# Patient Record
Sex: FEMALE | Employment: PART TIME | URBAN - METROPOLITAN AREA
[De-identification: names, ages, dates, MRNs, and addresses within clinical notes are randomized per-mention and may not be internally consistent; named-entity substitution may affect disease eponyms.]

---

## 2018-09-19 ENCOUNTER — TRANSFERRED RECORDS (OUTPATIENT)
Dept: HEALTH INFORMATION MANAGEMENT | Facility: CLINIC | Age: 33
End: 2018-09-19

## 2018-09-19 LAB
HPV ABSTRACT: NORMAL
PAP-ABSTRACT: NORMAL

## 2019-11-25 ENCOUNTER — OFFICE VISIT (OUTPATIENT)
Dept: FAMILY MEDICINE | Facility: CLINIC | Age: 34
End: 2019-11-25
Payer: COMMERCIAL

## 2019-11-25 VITALS
BODY MASS INDEX: 33.6 KG/M2 | HEART RATE: 82 BPM | SYSTOLIC BLOOD PRESSURE: 137 MMHG | TEMPERATURE: 98 F | DIASTOLIC BLOOD PRESSURE: 83 MMHG | HEIGHT: 71 IN | OXYGEN SATURATION: 98 % | RESPIRATION RATE: 16 BRPM | WEIGHT: 240 LBS

## 2019-11-25 DIAGNOSIS — R19.7 DIARRHEA, UNSPECIFIED TYPE: ICD-10-CM

## 2019-11-25 DIAGNOSIS — Z00.00 ROUTINE GENERAL MEDICAL EXAMINATION AT A HEALTH CARE FACILITY: Primary | ICD-10-CM

## 2019-11-25 DIAGNOSIS — M25.561 ACUTE PAIN OF RIGHT KNEE: ICD-10-CM

## 2019-11-25 DIAGNOSIS — R87.610 ATYPICAL SQUAMOUS CELLS OF UNDETERMINED SIGNIFICANCE ON CYTOLOGIC SMEAR OF CERVIX (ASC-US): ICD-10-CM

## 2019-11-25 PROBLEM — J31.0 CHRONIC RHINITIS: Status: ACTIVE | Noted: 2018-08-15

## 2019-11-25 LAB
ALBUMIN SERPL-MCNC: 3.8 G/DL (ref 3.4–5)
ALBUMIN UR-MCNC: NEGATIVE MG/DL
ALP SERPL-CCNC: 147 U/L (ref 40–150)
ALT SERPL W P-5'-P-CCNC: 83 U/L (ref 0–50)
ANION GAP SERPL CALCULATED.3IONS-SCNC: 4 MMOL/L (ref 3–14)
APPEARANCE UR: ABNORMAL
AST SERPL W P-5'-P-CCNC: 45 U/L (ref 0–45)
BACTERIA #/AREA URNS HPF: ABNORMAL /HPF
BASOPHILS # BLD AUTO: 0 10E9/L (ref 0–0.2)
BASOPHILS NFR BLD AUTO: 0.5 %
BILIRUB SERPL-MCNC: 0.2 MG/DL (ref 0.2–1.3)
BILIRUB UR QL STRIP: NEGATIVE
BUN SERPL-MCNC: 15 MG/DL (ref 7–30)
CALCIUM SERPL-MCNC: 9.2 MG/DL (ref 8.5–10.1)
CHLORIDE SERPL-SCNC: 106 MMOL/L (ref 94–109)
CO2 SERPL-SCNC: 29 MMOL/L (ref 20–32)
COLOR UR AUTO: YELLOW
CREAT SERPL-MCNC: 0.85 MG/DL (ref 0.52–1.04)
DIFFERENTIAL METHOD BLD: NORMAL
EOSINOPHIL # BLD AUTO: 0.4 10E9/L (ref 0–0.7)
EOSINOPHIL NFR BLD AUTO: 7.6 %
ERYTHROCYTE [DISTWIDTH] IN BLOOD BY AUTOMATED COUNT: 13.9 % (ref 10–15)
GFR SERPL CREATININE-BSD FRML MDRD: 89 ML/MIN/{1.73_M2}
GGT SERPL-CCNC: 98 U/L (ref 0–40)
GLUCOSE SERPL-MCNC: 86 MG/DL (ref 70–99)
GLUCOSE UR STRIP-MCNC: NEGATIVE MG/DL
HCG UR QL: NEGATIVE
HCT VFR BLD AUTO: 42.2 % (ref 35–47)
HGB BLD-MCNC: 13.7 G/DL (ref 11.7–15.7)
HGB UR QL STRIP: NEGATIVE
KETONES UR STRIP-MCNC: NEGATIVE MG/DL
LEUKOCYTE ESTERASE UR QL STRIP: ABNORMAL
LYMPHOCYTES # BLD AUTO: 2.3 10E9/L (ref 0.8–5.3)
LYMPHOCYTES NFR BLD AUTO: 39.9 %
MCH RBC QN AUTO: 29.8 PG (ref 26.5–33)
MCHC RBC AUTO-ENTMCNC: 32.5 G/DL (ref 31.5–36.5)
MCV RBC AUTO: 92 FL (ref 78–100)
MONOCYTES # BLD AUTO: 0.4 10E9/L (ref 0–1.3)
MONOCYTES NFR BLD AUTO: 6.6 %
NEUTROPHILS # BLD AUTO: 2.6 10E9/L (ref 1.6–8.3)
NEUTROPHILS NFR BLD AUTO: 45.4 %
NITRATE UR QL: NEGATIVE
NON-SQ EPI CELLS #/AREA URNS LPF: ABNORMAL /LPF
PH UR STRIP: 5.5 PH (ref 5–7)
PLATELET # BLD AUTO: 216 10E9/L (ref 150–450)
POTASSIUM SERPL-SCNC: 4.1 MMOL/L (ref 3.4–5.3)
PROT SERPL-MCNC: 7.9 G/DL (ref 6.8–8.8)
RBC # BLD AUTO: 4.6 10E12/L (ref 3.8–5.2)
RBC #/AREA URNS AUTO: ABNORMAL /HPF
SODIUM SERPL-SCNC: 139 MMOL/L (ref 133–144)
SOURCE: ABNORMAL
SP GR UR STRIP: >1.03 (ref 1–1.03)
UROBILINOGEN UR STRIP-ACNC: 0.2 EU/DL (ref 0.2–1)
WBC # BLD AUTO: 5.8 10E9/L (ref 4–11)
WBC #/AREA URNS AUTO: ABNORMAL /HPF

## 2019-11-25 PROCEDURE — 81025 URINE PREGNANCY TEST: CPT | Performed by: NURSE PRACTITIONER

## 2019-11-25 PROCEDURE — 99203 OFFICE O/P NEW LOW 30 MIN: CPT | Performed by: NURSE PRACTITIONER

## 2019-11-25 PROCEDURE — 87506 IADNA-DNA/RNA PROBE TQ 6-11: CPT | Performed by: NURSE PRACTITIONER

## 2019-11-25 PROCEDURE — 36415 COLL VENOUS BLD VENIPUNCTURE: CPT | Performed by: NURSE PRACTITIONER

## 2019-11-25 PROCEDURE — 80050 GENERAL HEALTH PANEL: CPT | Performed by: NURSE PRACTITIONER

## 2019-11-25 PROCEDURE — 87086 URINE CULTURE/COLONY COUNT: CPT | Performed by: NURSE PRACTITIONER

## 2019-11-25 PROCEDURE — 82977 ASSAY OF GGT: CPT | Performed by: NURSE PRACTITIONER

## 2019-11-25 PROCEDURE — 80061 LIPID PANEL: CPT | Performed by: NURSE PRACTITIONER

## 2019-11-25 PROCEDURE — 87493 C DIFF AMPLIFIED PROBE: CPT | Performed by: NURSE PRACTITIONER

## 2019-11-25 PROCEDURE — 81001 URINALYSIS AUTO W/SCOPE: CPT | Performed by: NURSE PRACTITIONER

## 2019-11-25 RX ORDER — VITAMIN A ACETATE, .BETA.-CAROTENE, ASCORBIC ACID, CHOLECALCIFEROL, .ALPHA.-TOCOPHEROL ACETATE, DL-, THIAMINE MONONITRATE, RIBOFLAVIN, NIACINAMIDE, PYRIDOXINE HYDROCHLORIDE, FOLIC ACID, CYANOCOBALAMIN, CALCIUM CARBONATE, FERROUS FUMARATE, ZINC OXIDE, AND CUPRIC OXIDE 2000; 2000; 120; 400; 22; 1.84; 3; 20; 10; 1; 12; 200; 27; 25; 2 [IU]/1; [IU]/1; MG/1; [IU]/1; MG/1; MG/1; MG/1; MG/1; MG/1; MG/1; UG/1; MG/1; MG/1; MG/1; MG/1
1 TABLET ORAL DAILY
COMMUNITY

## 2019-11-25 RX ORDER — CHLORAL HYDRATE 500 MG
1000 CAPSULE ORAL DAILY
COMMUNITY

## 2019-11-25 ASSESSMENT — PAIN SCALES - GENERAL: PAINLEVEL: MILD PAIN (3)

## 2019-11-25 ASSESSMENT — MIFFLIN-ST. JEOR: SCORE: 1884.76

## 2019-11-25 NOTE — PATIENT INSTRUCTIONS
The CBC (infection-fighting cells and anemia levels) was normal.  Your pregnancy test was negative.      Let's see what the stool sample shows as well as the labs for gallbladder inflammation.      Try bulking the stool with metamucil or psyllium husk to see if this helps with the frequency.  Avoid fatty foods and stay well-hydrated (10 glasses of water a day)

## 2019-11-25 NOTE — PROGRESS NOTES
Subjective     Shanna Hernandez is a 34 year old female who presents to clinic today for the following health issues:    HPI   Diarrhea  Onset: 1.5 -2 weeks ago    Description:   Consistency of stool: watery, runny and loose  Blood in stool: no   Number of loose stools in past 24 hours: 5    Progression of Symptoms:  worsening    Accompanying Signs & Symptoms:  Fever: no   Nausea or vomiting; no   Abdominal pain: YES-right before she has bowel movement   Episodes of constipation: no   Weight loss: no     History:   Ill contacts: no   Recent use of antibiotics: no    Recent travels: no          Recent medication-new or changes(Rx or OTC): no     Precipitating factors:   Nothing     Alleviating factors:   Nothing     Therapies Tried and outcome:  None  Urgency, frequency  Stools are lquidy/soft  No bleeding  Worse with fatty foods  Couple days of nausea, no vomiting  No history of constipation  No new supplements, no diet changes.    Joint Pain    Onset: 3 weeks ago    Description:   Location: right knee  Character: Sharp , Knee popping out of place, knee locks up, no history injury    Intensity: severe    Progression of Symptoms: worse    Accompanying Signs & Symptoms:  Other symptoms: none    History:   Previous similar pain: no       Precipitating factors:   Trauma or overuse: no     Alleviating factors:  Improved by: nothing    Therapies Tried and outcome: resting         ASCUS HPV NEG 9/19/2018 [facility]  NEEDS REPEAT COTESTING 2021  GAVE BIRTH 4[birth year] to baby girl.  No complications.  She is currently in therapy for post partum depression.  This is going well.  She is a stay at home mom.        Patient Active Problem List   Diagnosis     Chronic rhinitis     Atypical squamous cells of undetermined significance on cytologic smear of cervix (ASC-US)     No past surgical history on file.    Social History     Tobacco Use     Smoking status: Former Smoker     Smokeless tobacco: Former User   Substance Use  "Topics     Alcohol use: Not on file     No family history on file.      Current Outpatient Medications   Medication Sig Dispense Refill     cholecalciferol (VITAMIN D-1000 MAX ST) 25 MCG (1000 UT) TABS Take 1,000 Units by mouth       fish oil-omega-3 fatty acids 1000 MG capsule Take 1,000 mg by mouth       Prenatal Vit-Fe Fumarate-FA (PNV PRENATAL PLUS MULTIVITAMIN) 27-1 MG TABS per tablet Take 1 tablet by mouth       Allergies   Allergen Reactions     Bees      Penicillins      Pineapple      BP Readings from Last 3 Encounters:   11/25/19 137/83    Wt Readings from Last 3 Encounters:   11/25/19 108.9 kg (240 lb)                 Reviewed and updated as needed this visit by Provider         Review of Systems   ROS COMP: Constitutional, HEENT, cardiovascular, pulmonary, GI, , musculoskeletal, neuro, skin, endocrine and psych systems are negative, except as otherwise noted.      Objective    /83 (BP Location: Left arm, Patient Position: Sitting, Cuff Size: Adult Large)   Pulse 82   Temp 98  F (36.7  C) (Oral)   Resp 16   Ht 1.803 m (5' 11\")   Wt 108.9 kg (240 lb)   SpO2 98%   BMI 33.47 kg/m    Body mass index is 33.47 kg/m .  Physical Exam   GENERAL: healthy, alert and no distress  NECK: no adenopathy, no asymmetry, masses, or scars and thyroid normal to palpation  RESP: lungs clear to auscultation - no rales, rhonchi or wheezes  CV: regular rate and rhythm, normal S1 S2, no S3 or S4, no murmur, click or rub, no peripheral edema and peripheral pulses strong  ABDOMEN: soft, nontender, no hepatosplenomegaly, no masses and bowel sounds normal  MS: right knee:  +mcmurrays  PSYCH: mentation appears normal, affect normal/bright    Diagnostic Test Results:  Results for orders placed or performed in visit on 11/25/19 (from the past 24 hour(s))   CBC with platelets and differential   Result Value Ref Range    WBC 5.8 4.0 - 11.0 10e9/L    RBC Count 4.60 3.8 - 5.2 10e12/L    Hemoglobin 13.7 11.7 - 15.7 g/dL    " Hematocrit 42.2 35.0 - 47.0 %    MCV 92 78 - 100 fl    MCH 29.8 26.5 - 33.0 pg    MCHC 32.5 31.5 - 36.5 g/dL    RDW 13.9 10.0 - 15.0 %    Platelet Count 216 150 - 450 10e9/L    % Neutrophils 45.4 %    % Lymphocytes 39.9 %    % Monocytes 6.6 %    % Eosinophils 7.6 %    % Basophils 0.5 %    Absolute Neutrophil 2.6 1.6 - 8.3 10e9/L    Absolute Lymphocytes 2.3 0.8 - 5.3 10e9/L    Absolute Monocytes 0.4 0.0 - 1.3 10e9/L    Absolute Eosinophils 0.4 0.0 - 0.7 10e9/L    Absolute Basophils 0.0 0.0 - 0.2 10e9/L    Diff Method Automated Method    *UA reflex to Microscopic and Culture ([place] [facility] (except [place] and [place])   Result Value Ref Range    Color Urine Yellow     Appearance Urine Slightly Cloudy     Glucose Urine Negative NEG^Negative mg/dL    Bilirubin Urine Negative NEG^Negative    Ketones Urine Negative NEG^Negative mg/dL    Specific Gravity Urine >1.030 1.003 - 1.035    Blood Urine Negative NEG^Negative    pH Urine 5.5 5.0 - 7.0 pH    Protein Albumin Urine Negative NEG^Negative mg/dL    Urobilinogen Urine 0.2 0.2 - 1.0 EU/dL    Nitrite Urine Negative NEG^Negative    Leukocyte Esterase Urine Small (A) NEG^Negative    Source Midstream Urine    HCG Qual, Urine ([id number])   Result Value Ref Range    HCG Qual Urine Negative NEG^Negative   Urine Microscopic   Result Value Ref Range    WBC Urine 5-10 (A) OTO5^0 - 5 /HPF    RBC Urine O - 2 OTO2^O - 2 /HPF    Squamous Epithelial /LPF Urine Moderate (A) FEW^Few /LPF    Bacteria Urine Moderate (A) NEG^Negative /HPF           Assessment & Plan     1. Routine general medical examination at a health care facility  - TSH with free T4 reflex  - Lipid panel reflex to direct LDL Non-fasting    2. Acute pain of right knee  Suspicion for meniscus involvement   - ORTHOPEDICS ADULT REFERRAL    3. Diarrhea, unspecified type  Unclear etiology.  Normal labs as above.  Plan pending results.    - Clostridium difficile Toxin B PCR; Future  - Enteric Bacteria and Virus  "Panel by JARETT Stool; Future  - GGT  - Comprehensive metabolic panel (BMP + Alb, Alk Phos, ALT, AST, Total. Bili, TP)  - *UA reflex to Microscopic and Culture ([place] and [facility] (except [place] and [place])  - HCG Qual, Urine ([id number])  - CBC with platelets and differential  - Urine Culture Aerobic Bacterial    4. Atypical squamous cells of undetermined significance on cytologic smear of cervix (ASC-US)  Due for cotesting 2021.      5. Postpartum depression  Treated with therapy.  Doing well.        BMI:   Estimated body mass index is 33.47 kg/m  as calculated from the following:    Height as of this encounter: 1.803 m (5' 11\").    Weight as of this encounter: 108.9 kg (240 lb).   Weight management plan: Discussed healthy diet and exercise guidelines        Patient Instructions   The CBC (infection-fighting cells and anemia levels) was normal.  Your pregnancy test was negative.      Let's see what the stool sample shows as well as the labs for gallbladder inflammation.      Try bulking the stool with metamucil or psyllium husk to see if this helps with the frequency.  Avoid fatty foods and stay well-hydrated (10 glasses of water a day)      Return in about 1 week (around 12/2/2019), or if symptoms worsen or fail to improve.    SUMANTH Gomez [facility]    "

## 2019-11-26 ENCOUNTER — TELEPHONE (OUTPATIENT)
Dept: FAMILY MEDICINE | Facility: CLINIC | Age: 34
End: 2019-11-26

## 2019-11-26 DIAGNOSIS — R19.7 DIARRHEA, UNSPECIFIED TYPE: ICD-10-CM

## 2019-11-26 DIAGNOSIS — R79.89 ELEVATED LFTS: Primary | ICD-10-CM

## 2019-11-26 LAB
BACTERIA SPEC CULT: NORMAL
C DIFF TOX B STL QL: NEGATIVE
CHOLEST SERPL-MCNC: 252 MG/DL
HDLC SERPL-MCNC: 36 MG/DL
LDLC SERPL CALC-MCNC: 144 MG/DL
NONHDLC SERPL-MCNC: 216 MG/DL
SPECIMEN SOURCE: NORMAL
SPECIMEN SOURCE: NORMAL
TRIGL SERPL-MCNC: 362 MG/DL
TSH SERPL DL<=0.005 MIU/L-ACNC: 1.14 MU/L (ref 0.4–4)

## 2019-11-26 NOTE — TELEPHONE ENCOUNTER
Pt returned call    Best number to reach caller: Home number on file [phone] (home)    Is it ok to leave a detailed message: sent to RN line

## 2019-11-26 NOTE — TELEPHONE ENCOUNTER
Patient called back and was informed of the liver enzyme results.    Shena Dotson RN, [facility] Triage

## 2019-11-26 NOTE — TELEPHONE ENCOUNTER
Notes recorded by Maria De Jesus Gardner APRN CNP on 11/26/2019 at 8:03 AM CST  Please call patient to discuss lab results.  Liver enzymes did come back slightly elevated which could indicate gallbladder inflammation.  I would like to do an ultrasound.  Please have her call [phone] to schedule.  Thank you.  SUMANTH Gomez CNP    This writer attempted to contact Shanna ([facility]) on 11/26/19      Reason for call results, provider instructions  and left message.      If patient calls back:   Registered Nurse called. Follow Triage Call workflow        Kylah Schwab RN

## 2019-11-27 ENCOUNTER — ANCILLARY PROCEDURE (OUTPATIENT)
Dept: ULTRASOUND IMAGING | Facility: CLINIC | Age: 34
End: 2019-11-27
Attending: NURSE PRACTITIONER
Payer: COMMERCIAL

## 2019-11-27 DIAGNOSIS — R79.89 ELEVATED LFTS: ICD-10-CM

## 2019-11-27 PROCEDURE — 76700 US EXAM ABDOM COMPLETE: CPT

## 2019-11-29 DIAGNOSIS — R16.0 HEPATOMEGALY: Primary | ICD-10-CM

## 2019-11-29 DIAGNOSIS — K76.0 FATTY LIVER: ICD-10-CM

## 2019-11-29 DIAGNOSIS — R79.89 ELEVATED LFTS: ICD-10-CM

## 2019-12-02 ENCOUNTER — TELEPHONE (OUTPATIENT)
Dept: FAMILY MEDICINE | Facility: CLINIC | Age: 34
End: 2019-12-02

## 2019-12-02 DIAGNOSIS — R10.84 ABDOMINAL PAIN, GENERALIZED: ICD-10-CM

## 2019-12-02 DIAGNOSIS — R19.7 DIARRHEA, UNSPECIFIED TYPE: Primary | ICD-10-CM

## 2019-12-02 NOTE — TELEPHONE ENCOUNTER
Reason for Call:  Other returning call    Detailed comments: Transferred to RN Line.    Phone Number Patient can be reached at: Home number on file [phone] (home)    Best Time: Any    Can we leave a detailed message on this number? Not Applicable    Call taken on 12/2/2019 at 9:23 AM by Lianet Casiano

## 2019-12-02 NOTE — TELEPHONE ENCOUNTER
Hi there,  Should be ok to travel as he stool samples were negative for infection and labs were ok.  Agree with taking immodium to help with symptoms.  Thank you,  SUMANTH Gomez CNP

## 2019-12-02 NOTE — TELEPHONE ENCOUNTER
Clarified with provider that both CT scan and GI consult were still recommended, before calling patient.  Provider agreed, states she wants patient to see GI for her liver and to have the CT scan for continued diarrhea. Called and spoke with patient and relayed this information.  Patient agreed and understanding, have both consult apt and CT scan scheduled for this week.    Patient had a separate question for provider, she is going to be traveling and flying to [place] on Sunday, will be gone for 5 days. Wants to make sure there would be no concern for her NOT to fly and go on trip. Patient noted she plans to take some medication to try to prevent or help hold diarrhea so maybe won't have to go so much on long plane ride. But otherwise, she stated she doesn't want to go on trip with out asking and doesn't want to find out after the fact that maybe she shouldn't have flown on plane.  Writer advised not likely any reason, at this time, restricting any travel or flying due to symptoms but will verify with provider and give call back. This made patient more comfortable and agreed with plan.    Routing to provider, do you feel there is any reason patient can not go on airplane and travel to [place] for 5 days, starting on Sunday, based on symptoms and need for further work-up?  Thank you.    Kylah Schwab RN  [facility]

## 2019-12-02 NOTE — TELEPHONE ENCOUNTER
Patient called into the clinic and we discussed lab results and GI referral. Number was provided to Shanna to call and schedule an apt with a GI provider. Patient has agreed to schedule.     States that diarrhea is still going on apx 10x/day.     Trying to stay hydrated. Taking pepto- bismol at night so she is able to get some sleep.     Oatmeal and granola with milk for breakfast    Pasta or sometime of grain for lunch    Dinner mashed potatoes and turkey or chicken with a vegetable.     We discussed drinking more than just water and incorporating some Pedialyte and Gatorade to help with electrolytes. Shanna agreed to give this a shot and will stay away from fluids that are red in color.     Routing message to provider to please review and advise.     Vira Siddiqui RN

## 2019-12-02 NOTE — TELEPHONE ENCOUNTER
Reason for Call:  Other appointment and call back    Detailed comments:Pt would like a call back as she is wondering if she should keep her appointment with the GI since she has a CT scheduled as well. Thank you.    Phone Number Patient can be reached at: Home number on file [phone] (home)    Best Time: Any    Can we leave a detailed message on this number? YES    Call taken on 12/2/2019 at 12:22 PM by Lianet Casiano

## 2019-12-02 NOTE — TELEPHONE ENCOUNTER
Called and discussed recommendations from Maria De Jesus Gardner. Patient agreed to schedule the CT scan and will follow the recommendations as well about pumping or feeding prior to receiving the contrast and will pump for 2-3 hrs after and discard that milk.     Patient aware after that she can resume normal feedings.    Vira Siddiqui RN

## 2019-12-02 NOTE — TELEPHONE ENCOUNTER
Called and confirmed with patient that her travel plans are okay per Maria De Jesus Gardner as her labs came back normal and her stool sample negative for infection. Patient also notified that imodium is a good plan to help with sx.     Vira Siddiqui RN

## 2019-12-02 NOTE — TELEPHONE ENCOUNTER
This writer attempted to contact patient on 12/02/19      Reason for call results and left message.      If patient calls back:   Registered Nurse called. Follow Triage Call workflow        Vira Siddiqui, RN    Notes recorded by Maria De Jesus Gardner APRN CNP on 11/29/2019 at 11:06 PM CST  Please call patient to discuss lab results.  Normal stool samples.  Her liver ultrasound showed enlarged and fatty liver.  Given that her liver enzymes are elevated, I'd like her to see GI to discuss further.  Can call MG at [phone] to schedule.  Thank you,  SUMANTH Gomez CNP

## 2019-12-02 NOTE — TELEPHONE ENCOUNTER
Her symptoms should not be caused by the enlarged liver.  I think we should do a CT scan to evaluate further since the diarrhea is worsening.  Please have her call [phone] to schedule.      In meantime, she should also add in some stool bulking agents like metamucil.    For the contrast agent for the CT scan, very little is excreted in the breast milk and is not known to cause harm to the infant.  I would recommend she nurse or pump right before taking the contrast.  If able, pump 2-3 hours after and discard that milk.  Then can continue feeding as she normally would.    Thank you,  SUMANTH Gomez CNP

## 2019-12-04 ENCOUNTER — OFFICE VISIT (OUTPATIENT)
Dept: GASTROENTEROLOGY | Facility: CLINIC | Age: 34
End: 2019-12-04
Attending: NURSE PRACTITIONER
Payer: COMMERCIAL

## 2019-12-04 VITALS
OXYGEN SATURATION: 97 % | HEIGHT: 71 IN | WEIGHT: 242.5 LBS | BODY MASS INDEX: 33.95 KG/M2 | SYSTOLIC BLOOD PRESSURE: 136 MMHG | HEART RATE: 94 BPM | DIASTOLIC BLOOD PRESSURE: 88 MMHG

## 2019-12-04 DIAGNOSIS — K76.0 FATTY LIVER: ICD-10-CM

## 2019-12-04 DIAGNOSIS — R79.89 ELEVATED LFTS: ICD-10-CM

## 2019-12-04 DIAGNOSIS — R19.7 DIARRHEA, UNSPECIFIED TYPE: Primary | ICD-10-CM

## 2019-12-04 LAB
CRP SERPL-MCNC: 10.6 MG/L (ref 0–8)
HAV IGG SER QL IA: NONREACTIVE
HBV CORE AB SERPL QL IA: NONREACTIVE
HBV CORE IGM SERPL QL IA: NONREACTIVE
HBV SURFACE AB SERPL IA-ACNC: 662.22 M[IU]/ML
HBV SURFACE AG SERPL QL IA: NONREACTIVE
HCV AB SERPL QL IA: NONREACTIVE

## 2019-12-04 PROCEDURE — 87177 OVA AND PARASITES SMEARS: CPT | Performed by: PHYSICIAN ASSISTANT

## 2019-12-04 PROCEDURE — 36415 COLL VENOUS BLD VENIPUNCTURE: CPT | Performed by: PHYSICIAN ASSISTANT

## 2019-12-04 PROCEDURE — 82104 ALPHA-1-ANTITRYPSIN PHENO: CPT | Performed by: PHYSICIAN ASSISTANT

## 2019-12-04 PROCEDURE — 86708 HEPATITIS A ANTIBODY: CPT | Performed by: PHYSICIAN ASSISTANT

## 2019-12-04 PROCEDURE — 86803 HEPATITIS C AB TEST: CPT | Performed by: PHYSICIAN ASSISTANT

## 2019-12-04 PROCEDURE — 83993 ASSAY FOR CALPROTECTIN FECAL: CPT | Performed by: PHYSICIAN ASSISTANT

## 2019-12-04 PROCEDURE — 83516 IMMUNOASSAY NONANTIBODY: CPT | Performed by: PHYSICIAN ASSISTANT

## 2019-12-04 PROCEDURE — 86705 HEP B CORE ANTIBODY IGM: CPT | Performed by: PHYSICIAN ASSISTANT

## 2019-12-04 PROCEDURE — 86706 HEP B SURFACE ANTIBODY: CPT | Performed by: PHYSICIAN ASSISTANT

## 2019-12-04 PROCEDURE — 99000 SPECIMEN HANDLING OFFICE-LAB: CPT | Performed by: PHYSICIAN ASSISTANT

## 2019-12-04 PROCEDURE — 86038 ANTINUCLEAR ANTIBODIES: CPT | Performed by: PHYSICIAN ASSISTANT

## 2019-12-04 PROCEDURE — 87340 HEPATITIS B SURFACE AG IA: CPT | Performed by: PHYSICIAN ASSISTANT

## 2019-12-04 PROCEDURE — 82784 ASSAY IGA/IGD/IGG/IGM EACH: CPT | Performed by: PHYSICIAN ASSISTANT

## 2019-12-04 PROCEDURE — 86140 C-REACTIVE PROTEIN: CPT | Performed by: PHYSICIAN ASSISTANT

## 2019-12-04 PROCEDURE — 80076 HEPATIC FUNCTION PANEL: CPT | Performed by: PHYSICIAN ASSISTANT

## 2019-12-04 PROCEDURE — 99204 OFFICE O/P NEW MOD 45 MIN: CPT | Performed by: PHYSICIAN ASSISTANT

## 2019-12-04 PROCEDURE — 86704 HEP B CORE ANTIBODY TOTAL: CPT | Performed by: PHYSICIAN ASSISTANT

## 2019-12-04 PROCEDURE — 83516 IMMUNOASSAY NONANTIBODY: CPT | Mod: 59 | Performed by: PHYSICIAN ASSISTANT

## 2019-12-04 PROCEDURE — 82103 ALPHA-1-ANTITRYPSIN TOTAL: CPT | Performed by: PHYSICIAN ASSISTANT

## 2019-12-04 PROCEDURE — 87328 CRYPTOSPORIDIUM AG IA: CPT | Performed by: PHYSICIAN ASSISTANT

## 2019-12-04 PROCEDURE — 87209 SMEAR COMPLEX STAIN: CPT | Performed by: PHYSICIAN ASSISTANT

## 2019-12-04 PROCEDURE — 87329 GIARDIA AG IA: CPT | Performed by: PHYSICIAN ASSISTANT

## 2019-12-04 RX ORDER — RIBOFLAVIN (VITAMIN B2) 100 MG
100 TABLET ORAL DAILY
COMMUNITY

## 2019-12-04 ASSESSMENT — MIFFLIN-ST. JEOR: SCORE: 1896.1

## 2019-12-04 ASSESSMENT — PAIN SCALES - GENERAL: PAINLEVEL: NO PAIN (0)

## 2019-12-04 NOTE — PATIENT INSTRUCTIONS
Thank you for visiting our Gastroenterology office today.     Please head down to the lab to obtain blood work. Please submit your stool samples when completed to any [facility] or [facility]. We will either call you or send you a My Chart message with your results.     Start taking citrucel or metamucil fiber one scoop a day. You can take imodium as needed.     Keep a food diary and log your symptoms.     Follow up in 3-4 weeks if no improvement in symptoms.       Patient Education     Nonalcoholic Fatty Liver Disease (NAFLD)  Nonalcoholic fatty liver disease (NAFLD) is a common disease of the liver. It occurs when you have too much fat in the liver. If NAFLD is severe, it can cause liver damage that seems like the damage caused by drinking too much alcohol. But NAFLD is not caused by drinking alcohol. This sheet tells you more about NAFLD and how it can be managed.    How the liver works   The liver is an organ in the upper right side of the belly (abdomen). It has many important jobs. These include:    Breaking down (metabolizing) proteins, carbohydrates, and fats    Making a substance called bile that helps break down fats    Storing and releasing sugar (glucose) into the blood to give the body energy    Removing toxins from the blood    Helping with blood clotting  Understanding NAFLD  A healthy liver may contain some fat. But if too much fat builds up in the liver, this causes NAFLD. NAFLD can be mild, causing fatty liver. Or it can be more severe and show inflammation, as well as the fat. This can cause non-alcoholic steatohepatitis (CLAUDIO).    Fatty liver. With fatty liver, the liver simply has more fat than normal. This extra fat usually does not harm the liver.    CLAUDIO. With CLAUDIO, the fatty liver becomes inflamed over time. CLAUDIO is serious because it can lead to scarring of the liver (fibrosis). Over time, the scarring may lead to cirrhosis of the liver. This can eventually cause liver  failure or liver cancer.  Causes and risk factors of NAFLD  Doctors don't know what causes NAFLD. But certain things make the problem more likely to happen. These include:    Obesity    Prediabetes or diabetes    High levels of fat found in the blood (cholesterol and triglycerides)    Being exposed to certain medicines   Symptoms of NAFLD  Most people with NAFLD have no symptoms. If symptoms do occur, they can include:    Tiredness    Weakness    Weight loss    Loss of appetite    Nausea and vomiting    Belly pain and cramping    Yellowing of the skin and eyes (jaundice), as well as dark urine, or light-colored stools    Swelling in the belly or legs  Diagnosing NAFLD  Your healthcare provider may think you have NAFLD if routine blood tests show high levels of liver enzymes. This may mean you have a liver problem. You may need one or more imaging tests, such as an ultrasound, CT, or MRI. You may need more blood tests to look for other causes of liver disease. You may also need a liver biopsy. During this test, a hollow needle is used to remove a tiny tissue sample from your liver. This tissue is then checked in a lab. This test can find signs of damage to liver tissue. It can also help figure out the cause of the damage and tell the difference between fatty liver and CLAUDIO.  Treating NAFLD  Treatment for NAFLD varies for each person. The best early treatment is to treat any underlying conditions causing metabolic syndrome. This is the name for a group of conditions that includes:    High blood pressure    High levels of cholesterol and triglycerides    Being overweight or obese    Diabetes  Your healthcare provider will monitor your health and treat any symptoms or underlying health problems you have. Your provider will also work with you to control your risk factors. This will make liver damage less likely. In fact, treating those underlying conditions can often improve liver disease. You may need to take certain  medicines, but no medicine will cure NAFLD. This is why treating the underlying conditions is most important. Your plan may include:    Losing extra weight    Getting regular exercise    Controlling diabetes and high cholesterol or triglyceride levels    Taking medicines and vitamins as prescribed by your provider    Quitting smoking    Not drinking alcohol    Eating a healthy and balanced diet  Living with NAFLD  If NAFLD is caught early, it can be managed with treatment. Your healthcare provider will discuss further treatment choices with you as needed.  Be sure to ask your provider about recommended vaccines. These include vaccines for viruses that can cause liver disease.  Date Last Reviewed: 12/1/2016 2000-2018 [employer]. [address] [place] [address]. All rights reserved. This information is not intended as a substitute for professional medical care. Always follow your healthcare professional's instructions.

## 2019-12-04 NOTE — NURSING NOTE
"Shanna Hernandez's goals for this visit include:   Chief Complaint   Patient presents with     RECHECK     follow up elevtated LFT's, fatty liver, and hepatomegaly       She requests these members of her care team be copied on today's visit information: PCP    PCP: Maria De Jesus Gardner    Referring Provider:  Maria De Jesus Gardner, APRN CNP  [address] [place] [address]    /88 (BP Location: Left arm, Patient Position: Sitting, Cuff Size: Adult Regular)   Pulse 94   Ht 1.803 m (5' 11\")   Wt 110 kg (242 lb 8 oz)   SpO2 97%   BMI 33.82 kg/m      Do you need any medication refills at today's visit? No    Sherrie Bearden LPN      "

## 2019-12-04 NOTE — PROGRESS NOTES
GASTROENTEROLOGY NEW PATIENT CLINIC VISIT    CC/REFERRING MD:    Clinic, [facility]  Maria De Jesus Gardner    REASON FOR CONSULTATION:   Referred by Maria De Jesus Gardner for RECHECK (follow up elevtated LFT's, fatty liver, and hepatomegaly)      HISTORY OF PRESENT ILLNESS:    Shanna Hernandez is 34 year old female who presents for evaluation of elevated LFT's and fatty liver. She also mentions diarrhea for the past 2.5 to 4 weeks.     She was recently noted to have a mildly elevated ALT level incidentally on lab work. A follow up abd sonogram revealed hepatomegaly with diffuse hepatic parenchymal disease most compatible with diffuse fatty liver. She does not smoke. She drinks very rarely as she is currently breast feeding. She denies previous hx of hepatitis.     In regards to diarrhea, she notes these symptoms developed at 2.5 weeks ago, but up to 4 weeks of symptoms. She may experience up to 10 stools a day which are runny and/or watery. She has some abdominal discomfort with these bowel movements, but this typically resolves after a BM. She notes having loose stools more so after breastfeeding. She hasn't been able to correlate her recent diarrhea with diet. She denies any foreign travel. No change in medications. No recent abx use. No recent sick contacts.  and patient do note more stress recently with an 8 month old daughter and holiday season. She was tested recently with stool studies which were unremarkable. She is scheduled for a CT scan of her abd for tomorrow, ordered by PCP.     No pertinent family hx.         PROBLEM LIST  Patient Active Problem List    Diagnosis Date Noted     Postpartum depression 11/25/2019     Priority: Medium     Atypical squamous cells of undetermined significance on cytologic smear of cervix (ASC-US) 10/01/2018     Priority: Medium     [facility] Review:    History:    2018: ASCUS HPV-    Plan, per ASCCP guidelines: Repeat co-test in 3 years (2021)        Chronic rhinitis 08/15/2018     Priority: Medium       PERTINENT PAST MEDICAL HISTORY:  (I personally reviewed this history with the patient at today's visit)   No past medical history on file.      PREVIOUS SURGERIES: (I personally reviewed this history with the patient at today's visit)   No past surgical history on file.      ALLERGIES:     Allergies   Allergen Reactions     Bees      Penicillins      Pineapple        PERTINENT MEDICATIONS:    Current Outpatient Medications:      cholecalciferol (VITAMIN D-1000 MAX ST) 25 MCG (1000 UT) TABS, Take 1,000 Units by mouth daily , Disp: , Rfl:      fish oil-omega-3 fatty acids 1000 MG capsule, Take 1,000 mg by mouth daily , Disp: , Rfl:      Prenatal Vit-Fe Fumarate-FA (PNV PRENATAL PLUS MULTIVITAMIN) 27-1 MG TABS per tablet, Take 1 tablet by mouth daily , Disp: , Rfl:      vitamin C (ASCORBIC ACID) 100 MG tablet, Take 100 mg by mouth daily, Disp: , Rfl:     SOCIAL HISTORY:  Social History     Socioeconomic History     Marital status:      Spouse name: Not on file     Number of children: Not on file     Years of education: Not on file     Highest education level: Not on file   Occupational History     Not on file   Social Needs     Financial resource strain: Not on file     Food insecurity:     Worry: Not on file     Inability: Not on file     Transportation needs:     Medical: Not on file     Non-medical: Not on file   Tobacco Use     Smoking status: Former Smoker     Smokeless tobacco: Former User   Substance and Sexual Activity     Alcohol use: Not on file     Drug use: Not on file     Sexual activity: Not on file   Lifestyle     Physical activity:     Days per week: Not on file     Minutes per session: Not on file     Stress: Not on file   Relationships     Social connections:     Talks on phone: Not on file     Gets together: Not on file     Attends Christianity service: Not on file     Active member of club or organization: Not on file     Attends  "meetings of clubs or organizations: Not on file     Relationship status: Not on file     Intimate partner violence:     Fear of current or ex partner: Not on file     Emotionally abused: Not on file     Physically abused: Not on file     Forced sexual activity: Not on file   Other Topics Concern     Not on file   Social History Narrative     Not on file       FAMILY HISTORY: (I personally reviewed this history with the patient at today's visit)  No family history on file.       Review of Systems  ROS:    No fevers or chills  No weight loss  No blurry vision, double vision or change in vision  No sore throat  No lymphadenopathy  No headache, paraesthesias, or weakness in a limb  No shortness of breath or wheezing  No chest pain or pressure  No arthralgias or myalgias  No rashes or skin changes  No odynophagia or dysphagia  No BRBPR, hematochezia, melena  No dysuria, frequency or urgency  No hot/cold intolerance or polyria  No anxiety or depression  PHYSICAL EXAMINATION:  Constitutional: aaox3, cooperative, pleasant, not dyspneic/diaphoretic, no acute distress  Vitals reviewed: /88 (BP Location: Left arm, Patient Position: Sitting, Cuff Size: Adult Regular)   Pulse 94   Ht 1.803 m (5' 11\")   Wt 110 kg (242 lb 8 oz)   SpO2 97%   BMI 33.82 kg/m     Wt:   Wt Readings from Last 2 Encounters:   12/04/19 110 kg (242 lb 8 oz)   11/25/19 108.9 kg (240 lb)        Eyes: Sclera anicteric/injected  Ears/nose/mouth/throat: Normal oropharynx without ulcers or exudate, mucus membranes moist, hearing intact  Neck: supple, thyroid normal size  CV: No edema, RRR  Respiratory: Unlabored breathing, CTAB  Abd:  Nondistended, no masses, +bs, nontender, no peritoneal signs  Skin: warm, perfused, no jaundice  Psych: Normal affect  MSK: Normal gait      PERTINENT STUDIES: (I personally reviewed these laboratory studies today)  Most recent CBC:   Recent Labs   Lab Test 11/25/19  1325   WBC 5.8   HGB 13.7   HCT 42.2    "     Most recent hepatic panel:  Recent Labs   Lab Test 11/25/19  1325   ALT 83*   AST 45     Most recent creatinine:  Recent Labs   Lab Test 11/25/19  1325   CR 0.85       TSH   Date Value Ref Range Status   11/25/2019 1.14 0.40 - 4.00 mU/L Final         RADIOLOGY:     Abdominal ultrasound 11/27/2019     Comparisons: None     HISTORY:    Elevated LFTs. Diarrhea.     FINDINGS:    The liver measures a craniocaudal dimension of 18.3 cm.  No focal liver lesion. There is marked diffuse increased echogenicity  of the liver parenchyma with significant attenuation of the ultrasound  beam. The spleen measures 11.9 cm. The gallbladder is normal. There is  no gallbladder wall thickening or pericholecystic fluid. No  sonographic Bills's sign was elicited.  The diameter of the common  bile duct measures 4mm. The pancreas is partially obscured but  otherwise appears unremarkable. The aorta and IVC are visualized. The  right and left kidneys measure 12.1cm and 12.9 cm , respectively. No  solid renal mass, stone or hydronephrosis.                                                                      IMPRESSION:    Hepatomegaly with diffuse hepatic parenchymal disease,  most compatible with diffuse fatty infiltration of the liver.     ANA ROSA CROUCH MD        ASSESSMENT/PLAN:    Shanna Hernandez is a 34 year old female who presents for evaluation of elevated LFT's and diarrhea.      Elevated LFT's/fatty liver - mild elevation in ALT level noted incidentally recently. Likely related to fatty liver which was noted on recent abd sono. NAFLD fibosis score correlates with fibrosis of F0-F2. Reviewed fatty liver disease diagnosis and management. Recommended healthy lifestyle changes such as diet and exercise and mgmt of any chronic conditions should they develop. At this time will check liver serologies to r/o underlying concerns, but seems unlikely. Will also have lft's rechecked today.      Diarrhea - reviewed recent stool  studies which were normal. Will check further stool studies including fecal calpro.  She does have a CT scan tomorrow. If fecal calpro elevated and or concerns for colitis noted will need to proced with colonoscopy. Will also check celiac serology. If positive or concerning, should consider upper endoscopy with small bowel bx. Symptoms however seem to be more related to IBS at this time. Recommended she trial fiber supplementation to bulk up stools and to keep food diary/log symptoms and trial food elimination diet. She can also take     Follow up in 3-4 weeks if no improvement in symptoms.     Diarrhea, unspecified type  Elevated LFTs  Fatty liver        Orders Placed This Encounter   Procedures     Hepatitis A Antibody IgG     Standing Status:   Future     Standing Expiration Date:   12/3/2020     Scheduling Instructions:      Order only when assessing immunity for HAV from either vaccination or previous infection.      Hepatitis B surface antigen     Standing Status:   Future     Standing Expiration Date:   12/3/2020     Hepatitis B Surface Antibody     Standing Status:   Future     Standing Expiration Date:   12/3/2020     Hepatitis B core antibody     Standing Status:   Future     Standing Expiration Date:   12/3/2020     Hepatitis B core antibody IgM     Standing Status:   Future     Standing Expiration Date:   12/3/2020     Hepatitis C antibody     Standing Status:   Future     Standing Expiration Date:   12/3/2020     IgG     Standing Status:   Future     Standing Expiration Date:   12/3/2020     Anti Nuclear Deneen IgG by IFA with Reflex     Standing Status:   Future     Standing Expiration Date:   12/4/2020     F Actin EIA with reflex     Standing Status:   Future     Standing Expiration Date:   12/4/2020     Tissue transglutaminase deneen IgA and IgG     Standing Status:   Future     Standing Expiration Date:   12/3/2020     IgA     Standing Status:   Future     Standing Expiration Date:   12/3/2020      Mitochondrial M2 Antibody IgG     Standing Status:   Future     Standing Expiration Date:   12/3/2020     Alpha 1 Antitrypsin     Standing Status:   Future     Standing Expiration Date:   12/3/2020     Calprotectin Feces     Standing Status:   Future     Standing Expiration Date:   12/3/2020     Cryptosporidium/Giardia Immunoassay     Standing Status:   Future     Standing Expiration Date:   12/4/2020     Ova and Parasite Exam Routine     Standing Status:   Future     Standing Expiration Date:   12/3/2020         Thank you for this consultation.  It was a pleasure to participate in the care of this patient; please contact us with any further questions.      This note was created with voice recognition software, and while reviewed for accuracy, typos may remain.     Amarilis Arthur PA-C  Gastroenterology  [facility]

## 2019-12-05 ENCOUNTER — ANCILLARY PROCEDURE (OUTPATIENT)
Dept: CT IMAGING | Facility: CLINIC | Age: 34
End: 2019-12-05
Attending: NURSE PRACTITIONER
Payer: COMMERCIAL

## 2019-12-05 DIAGNOSIS — R19.7 DIARRHEA, UNSPECIFIED TYPE: ICD-10-CM

## 2019-12-05 DIAGNOSIS — R10.84 ABDOMINAL PAIN, GENERALIZED: ICD-10-CM

## 2019-12-05 LAB
ANA SER QL IF: NEGATIVE
C PARVUM AG STL QL IA: NEGATIVE
G LAMBLIA AG STL QL IA: NEGATIVE
IGA SERPL-MCNC: 312 MG/DL (ref 70–380)
IGG SERPL-MCNC: 1270 MG/DL (ref 695–1620)
MITOCHONDRIA M2 IGG SER-ACNC: 1 U/ML
O+P STL MICRO: NORMAL
O+P STL MICRO: NORMAL
SMA IGG SER-ACNC: 10 UNITS (ref 0–19)
SPECIMEN SOURCE: NORMAL
SPECIMEN SOURCE: NORMAL
TTG IGA SER-ACNC: 1 U/ML
TTG IGG SER-ACNC: <1 U/ML

## 2019-12-05 PROCEDURE — 74177 CT ABD & PELVIS W/CONTRAST: CPT | Performed by: RADIOLOGY

## 2019-12-05 RX ORDER — IOPAMIDOL 755 MG/ML
135 INJECTION, SOLUTION INTRAVASCULAR ONCE
Status: COMPLETED | OUTPATIENT
Start: 2019-12-05 | End: 2019-12-05

## 2019-12-05 RX ADMIN — IOPAMIDOL 131 ML: 755 INJECTION, SOLUTION INTRAVASCULAR at 14:59

## 2019-12-06 ENCOUNTER — TELEPHONE (OUTPATIENT)
Dept: FAMILY MEDICINE | Facility: CLINIC | Age: 34
End: 2019-12-06

## 2019-12-06 LAB
A1AT PHENOTYP SERPL-IMP: NORMAL
A1AT SERPL-MCNC: 112 MG/DL (ref 90–200)
ALBUMIN SERPL-MCNC: 3.5 G/DL (ref 3.4–5)
ALP SERPL-CCNC: 137 U/L (ref 40–150)
ALT SERPL W P-5'-P-CCNC: 89 U/L (ref 0–50)
AST SERPL W P-5'-P-CCNC: 54 U/L (ref 0–45)
BILIRUB DIRECT SERPL-MCNC: <0.1 MG/DL (ref 0–0.2)
BILIRUB SERPL-MCNC: 0.2 MG/DL (ref 0.2–1.3)
CALPROTECTIN STL-MCNT: 276 MG/KG (ref 0–49.9)
PROT SERPL-MCNC: 7.7 G/DL (ref 6.8–8.8)

## 2019-12-06 NOTE — TELEPHONE ENCOUNTER
This writer attempted to contact Shanna on 12/06/19      Reason for call results and left message.      If patient calls back:   Registered Nurse called. Follow Triage Call workflow        Patricia Roe RN     Notes recorded by Maria De Jesus Gardner APRN CNP on 12/5/2019 at 4:58 PM CST  Please call patient to discuss results.  There is no acute finding on her CT scan.  Recommend she stick with the plan to see GI.  Can do immodium as needed for diarrhea. Thanks!  Maria De Jesus

## 2019-12-06 NOTE — TELEPHONE ENCOUNTER
Pt returned call    Best number to reach caller: Home number on file [phone] (home)    Is it ok to leave a detailed message: sent to RN line

## 2019-12-12 ENCOUNTER — TELEPHONE (OUTPATIENT)
Dept: GASTROENTEROLOGY | Facility: CLINIC | Age: 34
End: 2019-12-12

## 2019-12-12 DIAGNOSIS — R79.89 ELEVATED LFTS: Primary | ICD-10-CM

## 2020-01-13 DIAGNOSIS — R79.89 ELEVATED LFTS: ICD-10-CM

## 2020-01-13 LAB
ALBUMIN SERPL-MCNC: 3.8 G/DL (ref 3.4–5)
ALP SERPL-CCNC: 143 U/L (ref 40–150)
ALT SERPL W P-5'-P-CCNC: 88 U/L (ref 0–50)
AST SERPL W P-5'-P-CCNC: 47 U/L (ref 0–45)
BILIRUB DIRECT SERPL-MCNC: <0.1 MG/DL (ref 0–0.2)
BILIRUB SERPL-MCNC: 0.3 MG/DL (ref 0.2–1.3)
PROT SERPL-MCNC: 8 G/DL (ref 6.8–8.8)

## 2020-01-13 PROCEDURE — 36415 COLL VENOUS BLD VENIPUNCTURE: CPT | Performed by: PHYSICIAN ASSISTANT

## 2020-01-13 PROCEDURE — 80076 HEPATIC FUNCTION PANEL: CPT | Performed by: PHYSICIAN ASSISTANT

## 2020-01-15 ENCOUNTER — OFFICE VISIT (OUTPATIENT)
Dept: GASTROENTEROLOGY | Facility: CLINIC | Age: 35
End: 2020-01-15
Payer: COMMERCIAL

## 2020-01-15 VITALS
DIASTOLIC BLOOD PRESSURE: 81 MMHG | BODY MASS INDEX: 34.24 KG/M2 | OXYGEN SATURATION: 95 % | TEMPERATURE: 97.8 F | HEART RATE: 86 BPM | WEIGHT: 245.5 LBS | RESPIRATION RATE: 20 BRPM | SYSTOLIC BLOOD PRESSURE: 125 MMHG

## 2020-01-15 DIAGNOSIS — R19.5 FECES CONTENTS ABNORMAL: ICD-10-CM

## 2020-01-15 DIAGNOSIS — K76.0 FATTY LIVER: ICD-10-CM

## 2020-01-15 DIAGNOSIS — R19.7 DIARRHEA, UNSPECIFIED TYPE: ICD-10-CM

## 2020-01-15 DIAGNOSIS — R79.89 ELEVATED LFTS: Primary | ICD-10-CM

## 2020-01-15 PROCEDURE — 99213 OFFICE O/P EST LOW 20 MIN: CPT | Performed by: PHYSICIAN ASSISTANT

## 2020-01-15 ASSESSMENT — PAIN SCALES - GENERAL: PAINLEVEL: NO PAIN (0)

## 2020-01-15 NOTE — NURSING NOTE
Shanna Hernandez's goals for this visit include:   Chief Complaint   Patient presents with     RECHECK     Four week follow up       She requests these members of her care team be copied on today's visit information: Yes    PCP: Maria De Jesus Gardner    Referring Provider:  No referring provider defined for this encounter.    /81 (BP Location: Left arm, Patient Position: Sitting, Cuff Size: Adult Large)   Pulse 86   Temp 97.8  F (36.6  C) (Oral)   Resp 20   Wt 111.4 kg (245 lb 8 oz)   SpO2 95%   BMI 34.24 kg/m      Do you need any medication refills at today's visit? No    Davion Angeles CMA

## 2020-01-15 NOTE — PROGRESS NOTES
GASTROENTEROLOGY FOLLOW UP CLINIC VISIT    CC/REFERRING MD:    Clinic, [facility]  Maria De Jesus Gardner    REASON FOR CONSULTATION:   Referred by Maria De Jesus Gardner for RECHECK (Four week follow up)      HISTORY OF PRESENT ILLNESS:    Shanna Hernandez is 34 year old female who presents for follow up.  She was initially seen last month for concerns with elevated LFTs and fatty liver.  She was noted to have mildly elevated elevated ALT and AST.  I further evaluated with liver serologies all of which were unremarkable including viral hepatitis panel, autoimmune markers and celiac serology.  We repeated her LFTs recently which continued to be mildly elevated.  She denies any abdominal pain, nausea, vomiting, itching, jaundice.     She was also having diarrhea for several weeks which we discussed at our initial office visit.  She was further evaluated with stool studies which were negative for infection.  We also checked fecal calprotectin level which was mildly elevated at 276.  She notes at this time that her diarrhea is much improved.  She was initially having up to 10 stools a day and she is now noting maybe 1-3 softer semi-formed stools daily.  She believes that diarrhea was related to stress around the holidays.     No pertinent family hx.         PROBLEM LIST  Patient Active Problem List    Diagnosis Date Noted     Postpartum depression 11/25/2019     Priority: Medium     Atypical squamous cells of undetermined significance on cytologic smear of cervix (ASC-US) 10/01/2018     Priority: Medium     [facility] Review:    History:    2018: ASCUS HPV-    Plan, per ASCCP guidelines: Repeat co-test in 3 years (2021)       Chronic rhinitis 08/15/2018     Priority: Medium       PERTINENT PAST MEDICAL HISTORY:  (I personally reviewed this history with the patient at today's visit)   No past medical history on file.      PREVIOUS SURGERIES: (I personally reviewed this history with the patient at  today's visit)   No past surgical history on file.      ALLERGIES:     Allergies   Allergen Reactions     Bees      Penicillins      Pineapple        PERTINENT MEDICATIONS:    Current Outpatient Medications:      cholecalciferol (VITAMIN D-1000 MAX ST) 25 MCG (1000 UT) TABS, Take 1,000 Units by mouth daily , Disp: , Rfl:      fish oil-omega-3 fatty acids 1000 MG capsule, Take 1,000 mg by mouth daily , Disp: , Rfl:      Prenatal Vit-Fe Fumarate-FA (PNV PRENATAL PLUS MULTIVITAMIN) 27-1 MG TABS per tablet, Take 1 tablet by mouth daily , Disp: , Rfl:      vitamin C (ASCORBIC ACID) 100 MG tablet, Take 100 mg by mouth daily, Disp: , Rfl:     SOCIAL HISTORY:  Social History     Socioeconomic History     Marital status:      Spouse name: Not on file     Number of children: Not on file     Years of education: Not on file     Highest education level: Not on file   Occupational History     Not on file   Social Needs     Financial resource strain: Not on file     Food insecurity:     Worry: Not on file     Inability: Not on file     Transportation needs:     Medical: Not on file     Non-medical: Not on file   Tobacco Use     Smoking status: Former Smoker     Smokeless tobacco: Former User   Substance and Sexual Activity     Alcohol use: Not on file     Drug use: Not on file     Sexual activity: Not on file   Lifestyle     Physical activity:     Days per week: Not on file     Minutes per session: Not on file     Stress: Not on file   Relationships     Social connections:     Talks on phone: Not on file     Gets together: Not on file     Attends [employer] service: Not on file     Active member of club or organization: Not on file     Attends meetings of clubs or organizations: Not on file     Relationship status: Not on file     Intimate partner violence:     Fear of current or ex partner: Not on file     Emotionally abused: Not on file     Physically abused: Not on file     Forced sexual activity: Not on file   Other  Topics Concern     Not on file   Social History Narrative     Not on file       FAMILY HISTORY: (I personally reviewed this history with the patient at today's visit)  No family history on file.       ROS:  No fevers or chills  No weight loss  No sore throat  No shortness of breath or wheezing  No chest pain or pressure  No arthralgias or myalgias  No rashes or skin changes  No odynophagia or dysphagia  No BRBPR, hematochezia, melena  No dysuria, frequency or urgency  No hot/cold intolerance or polyria  No anxiety or depression  PHYSICAL EXAMINATION:  Constitutional: aaox3, cooperative, pleasant, not dyspneic/diaphoretic, no acute distress  Vitals reviewed: /81 (BP Location: Left arm, Patient Position: Sitting, Cuff Size: Adult Large)   Pulse 86   Temp 97.8  F (36.6  C) (Oral)   Resp 20   Wt 111.4 kg (245 lb 8 oz)   SpO2 95%   BMI 34.24 kg/m     Wt:   Wt Readings from Last 2 Encounters:   01/15/20 111.4 kg (245 lb 8 oz)   12/04/19 110 kg (242 lb 8 oz)        Eyes: Sclera anicteric/injected  Ears/nose/mouth/throat: Normal oropharynx without ulcers or exudate, mucus membranes moist  Neck: supple  CV: No edema, RRR  Respiratory: Unlabored breathing, CTAB  Abd:  Nondistended, no masses, +bs, nontender, no peritoneal signs  Skin: warm, perfused, no jaundice  Psych: Normal affect  MSK: Normal gait      PERTINENT STUDIES: (I personally reviewed these laboratory studies today)  Most recent CBC:   Recent Labs   Lab Test 11/25/19  1325   WBC 5.8   HGB 13.7   HCT 42.2        Most recent hepatic panel:  Recent Labs   Lab Test 01/13/20  1102 12/04/19  0937   ALT 88* 89*   AST 47* 54*     Most recent creatinine:  Recent Labs   Lab Test 11/25/19  1325   CR 0.85       TSH   Date Value Ref Range Status   11/25/2019 1.14 0.40 - 4.00 mU/L Final         RADIOLOGY:     Abdominal ultrasound 11/27/2019     Comparisons: None     HISTORY:    Elevated LFTs. Diarrhea.     FINDINGS:    The liver measures a craniocaudal  dimension of 18.3 cm.  No focal liver lesion. There is marked diffuse increased echogenicity  of the liver parenchyma with significant attenuation of the ultrasound  beam. The spleen measures 11.9 cm. The gallbladder is normal. There is  no gallbladder wall thickening or pericholecystic fluid. No  sonographic Bills's sign was elicited.  The diameter of the common  bile duct measures 4mm. The pancreas is partially obscured but  otherwise appears unremarkable. The aorta and IVC are visualized. The  right and left kidneys measure 12.1cm and 12.9 cm , respectively. No  solid renal mass, stone or hydronephrosis.                                                                      IMPRESSION:    Hepatomegaly with diffuse hepatic parenchymal disease,  most compatible with diffuse fatty infiltration of the liver.     ANA ROSA CROUCH MD        ASSESSMENT/PLAN:    Shanna Hernandez is a 34 year old female who presents for follow up.     Elevated LFT's/Fatty liver -patient continues to have mildly elevated ALT and AST levels.  Work-up has included liver serologies which were overall unremarkable.  Her mild elevation is likely related to fatty liver which has been noted on abdominal sono.  Her now full fibrosis score rules out significant fibrosis.  Recommended patient to focus on healthy lifestyle changes including diet and exercise.  We can repeat her LFTs in 6 months time.    Diarrhea/elevated fecal calpro -she notes improvement in her diarrhea since our initial office visit.  She believes her symptoms are more related to stress around the time of holidays.  Previous work-up however did show a mildly elevated fecal calprotectin level of 276.  Remainder of stool infectious studies were negative.  Recommended we repeat her fecal calproectin leel at this time.  If still elevated should consider colonoscopy. Recommended fiber supplementation to promote bowel regularity.       Elevated LFTs  Fatty liver  Diarrhea,  unspecified type  Feces contents abnormal    Orders Placed This Encounter   Procedures     Calprotectin Feces     Hepatic panel         Thank you for this consultation.  It was a pleasure to participate in the care of this patient; please contact us with any further questions.      This note was created with voice recognition software, and while reviewed for accuracy, typos may remain.     Amarilis Arthur PA-C  Gastroenterology  [facility]

## 2020-01-15 NOTE — PATIENT INSTRUCTIONS
Thank you for visiting our Gastroenterology office today.     Please submit your stool samples when completed to any [facility] or [facility]. We will either call you or send you a My Chart message with your results.     Start taking citrucel or metamucil fiber supplement daily to promote bowel regularity.     Follow up in 6 months to recheck your liver.

## 2020-01-23 DIAGNOSIS — R19.7 DIARRHEA, UNSPECIFIED TYPE: ICD-10-CM

## 2020-01-23 DIAGNOSIS — R19.5 FECES CONTENTS ABNORMAL: ICD-10-CM

## 2020-01-23 PROCEDURE — 83993 ASSAY FOR CALPROTECTIN FECAL: CPT | Performed by: PHYSICIAN ASSISTANT

## 2020-01-24 LAB — CALPROTECTIN STL-MCNT: 20.3 MG/KG (ref 0–49.9)

## 2020-01-27 ENCOUNTER — TELEPHONE (OUTPATIENT)
Dept: GASTROENTEROLOGY | Facility: CLINIC | Age: 35
End: 2020-01-27

## 2020-01-27 NOTE — TELEPHONE ENCOUNTER
[facility] Call Center    Phone Message    May a detailed message be left on voicemail: yes    Reason for Call: Requesting Results   Name/type of test: Lab Results  Date of test: 1/15/2020 and 1/23/2020  Was test done at a location other than [facility] (Please fill in the location if not [facility])?: No      Action Taken: Message routed to:  Adult Clinics: Gastroenterology (GI) p [id number]

## 2020-03-11 ENCOUNTER — HEALTH MAINTENANCE LETTER (OUTPATIENT)
Age: 35
End: 2020-03-11

## 2020-07-14 ENCOUNTER — VIRTUAL VISIT (OUTPATIENT)
Dept: GASTROENTEROLOGY | Facility: CLINIC | Age: 35
End: 2020-07-14
Payer: COMMERCIAL

## 2020-07-14 DIAGNOSIS — K76.0 FATTY LIVER: ICD-10-CM

## 2020-07-14 DIAGNOSIS — R79.89 ELEVATED LFTS: Primary | ICD-10-CM

## 2020-07-14 PROCEDURE — 99213 OFFICE O/P EST LOW 20 MIN: CPT | Mod: GT | Performed by: PHYSICIAN ASSISTANT

## 2020-07-14 NOTE — PROGRESS NOTES
"Shanna Hernandez is a 34 year old female who is being evaluated via a billable video visit.      The patient has been notified of following: Shanna Hernandez    \"This video visit will be conducted via a call between you and your physician/provider. We have found that certain health care needs can be provided without the need for an in-person physical exam.  This service lets us provide the care you need with a video conversation.  If a prescription is necessary we can send it directly to your pharmacy.  If lab work is needed we can place an order for that and you can then stop by our lab to have the test done at a later time.    Video visits are billed at different rates depending on your insurance coverage.  Please reach out to your insurance provider with any questions.    If during the course of the call the physician/provider feels a video visit is not appropriate, you will not be charged for this service.\"    Patient has given verbal consent for Video visit? Yes  How would you like to obtain your AVS? [facility]  Patient would like the video invitation sent by: Other e-mail: [phone] MICHAEL TEXT   Will anyone else be joining your video visit? No        Video-Visit Details    Type of service:  Video Visit    Video Start Time: 9:38 AM  Video End Time: 9:52 AM    Originating Location (pt. Location): Home    Distant Location (provider location):  [facility]     Platform used for Video Visit: Michael Arthur PA-C        GASTROENTEROLOGY FOLLOW UP VIDEO VISIT         CC/REFERRING MD:    Maria De Jesus Gardner    REASON FOR CONSULTATION:    Video Visit (Follow up )        HISTORY OF PRESENT ILLNESS:    Shanna Hernandez is 34 year old female who presents for follow up. She was initially seen in December 2019 for concerns with elevated LFT's. She was noted to have mildly elevated LFTs at that time. We further evaluated with liver serologies all of which were unremarkable including " viral hepatitis panel, autoimmune markers and celiac serology. It was thought that her elevated LFT's were likely related to fatty liver disease. There is no abd pain. No n/v. No jaundice. No itching. She has recently started intermittent fasting.     She was also having diarrhea at our initial office visit in December 2019. Work up included stool studies which were negative for infection. She did have a mildly elevated fecal calpro initially, however on recheck this was found to be normal. Her diarrhea has much improved since then and she notes now that it is mainly during times of stress.      PMHx, PSHx, FHx, SHx have been reviewed.       ALLERGIES:  Allergies   Allergen Reactions     Bees      Penicillins      Pineapple        PERTINENT MEDICATIONS:    Current Outpatient Medications:      cholecalciferol (VITAMIN D-1000 MAX ST) 25 MCG (1000 UT) TABS, Take 1,000 Units by mouth daily , Disp: , Rfl:      fish oil-omega-3 fatty acids 1000 MG capsule, Take 1,000 mg by mouth daily , Disp: , Rfl:      Prenatal Vit-Fe Fumarate-FA (PNV PRENATAL PLUS MULTIVITAMIN) 27-1 MG TABS per tablet, Take 1 tablet by mouth daily , Disp: , Rfl:      vitamin C (ASCORBIC ACID) 100 MG tablet, Take 100 mg by mouth daily, Disp: , Rfl:     SOCIAL HISTORY:  Social History     Socioeconomic History     Marital status:      Spouse name: Not on file     Number of children: Not on file     Years of education: Not on file     Highest education level: Not on file   Occupational History     Not on file   Social Needs     Financial resource strain: Not on file     Food insecurity     Worry: Not on file     Inability: Not on file     Transportation needs     Medical: Not on file     Non-medical: Not on file   Tobacco Use     Smoking status: Former Smoker     Smokeless tobacco: Former User   Substance and Sexual Activity     Alcohol use: Not on file     Drug use: Not on file     Sexual activity: Not on file   Lifestyle     Physical activity      Days per week: Not on file     Minutes per session: Not on file     Stress: Not on file   Relationships     Social connections     Talks on phone: Not on file     Gets together: Not on file     Attends [employer] service: Not on file     Active member of club or organization: Not on file     Attends meetings of clubs or organizations: Not on file     Relationship status: Not on file     Intimate partner violence     Fear of current or ex partner: Not on file     Emotionally abused: Not on file     Physically abused: Not on file     Forced sexual activity: Not on file   Other Topics Concern     Not on file   Social History Narrative     Not on file       FAMILY HISTORY: (I personally reviewed this history with the patient at today's visit)  No pertinent family hx     ROS:    No fevers or chills  No weight loss  No blurry vision, double vision or change in vision  No sore throat  No lymphadenopathy  No headache, paraesthesias, or weakness in a limb  No shortness of breath or wheezing  No chest pain or pressure  No arthralgias or myalgias  No rashes or skin changes  No odynophagia or dysphagia  No BRBPR, hematochezia, melena  No dysuria, frequency or urgency  No hot/cold intolerance or polyria  No anxiety or depression      PHYSICAL EXAMINATION:  Constitutional: aaox3, cooperative, pleasant, not dyspneic/diaphoretic, no acute distress    GENERAL: Healthy, alert and no distress  EYES: Eyes grossly normal to inspection.  No discharge or erythema, or obvious scleral/conjunctival abnormalities.  RESP: No audible wheeze, cough, or visible cyanosis.  No visible retractions or increased work of breathing.    SKIN: Visible skin clear. No significant rash, abnormal pigmentation or lesions.  NEURO: Cranial nerves grossly intact.  Mentation and speech appropriate for age.  PSYCH: Mentation appears normal, affect normal/bright, judgement and insight intact, normal speech and appearance well-groomed.          PERTINENT STUDIES: (I  personally reviewed these laboratory studies today)  Most recent CBC:   Recent Labs   Lab Test 11/25/19  1325   WBC 5.8   HGB 13.7   HCT 42.2        Most recent hepatic panel:  Recent Labs   Lab Test 01/13/20  1102 12/04/19  0937   ALT 88* 89*   AST 47* 54*     Most recent creatinine:  Recent Labs   Lab Test 11/25/19  1325   CR 0.85       TSH   Date Value Ref Range Status   11/25/2019 1.14 0.40 - 4.00 mU/L Final         RADIOLOGY:      Exam: CT abdomen pelvis with contrast 12/5/2019 3:32 PM.     History: Diarrhea and generalized abdominal pain.     Comparison: Abdominal ultrasound 11/27/2019.     Technique: Helical CT images from the lung bases through the symphysis  pubis with intravenous contrast. Additional delayed postcontrast  images obtained due to extensive motion artifact on the initial  acquisition. Multiplanar reconstructions obtained and reviewed.     Contrast:131 ml Isovue 370      Dose: 1923 mGy*cm     Findings:      Breathing motion artifact on some of the sequences, repeated as above.     Abdomen/Pelvis:  Diffuse hypoattenuation in the liver with relative sparing along the  falciform ligament and gallbladder fossa. Liver is enlarged, measuring  up to 19.4 cm in craniocaudal dimension in the midclavicular line. No  suspicious hepatic lesion. The gallbladder, pancreas, and adrenal  glands are normal. Spleen is mildly enlarged, measuring up to 13.1 cm.  Adjacent splenules. Symmetric renal parenchymal enhancement. No  hydronephrosis or hydroureter. No renal or ureteral calculi. The  bladder is well-distended and normal in morphology. Uterus and ovaries  are normal.     No abnormally dilated bowel. No pneumatosis, portal venous gas, or  pneumoperitoneum. The appendix is normal. Formed stool within the  terminal ileum. Small colonic stool burden. No bowel wall thickening  or adjacent fat stranding to suggest bowel inflammation. No free  fluid. Major abdominal vasculature is patent and normal in  caliber. No  pelvic, mesenteric, or retroperitoneal lymphadenopathy.     Lower chest:  Visualized lung bases are clear. No pleural or pericardial effusion.     Bones and soft tissues:  No acute or suspicious osseous abnormality. No substantial  degenerative changes.                                                                      Impression:  1. No acute intra-abdominal pathology.  2. Fecalization of distal small bowel contents may suggest slow bowel  transit. Small colonic stool burden.  3. Diffuse hepatic steatosis and hepatomegaly.     I have personally reviewed the examination and initial interpretation  and I agree with the findings.     JAS TOWNSEND MD      Abdominal ultrasound 11/27/2019   Comparisons: None     HISTORY:    Elevated LFTs. Diarrhea.     FINDINGS:    The liver measures a craniocaudal dimension of 18.3 cm.  No focal liver lesion. There is marked diffuse increased echogenicity  of the liver parenchyma with significant attenuation of the ultrasound  beam. The spleen measures 11.9 cm. The gallbladder is normal. There is  no gallbladder wall thickening or pericholecystic fluid. No  sonographic Bills's sign was elicited.  The diameter of the common  bile duct measures 4mm. The pancreas is partially obscured but  otherwise appears unremarkable. The aorta and IVC are visualized. The  right and left kidneys measure 12.1cm and 12.9 cm , respectively. No  solid renal mass, stone or hydronephrosis.                                                                      IMPRESSION:    Hepatomegaly with diffuse hepatic parenchymal disease,  most compatible with diffuse fatty infiltration of the liver.     ANA ROSA CROUCH MD        ASSESSMENT/PLAN:    Shanna Hernandez is a 34 year old female who presents for follow up of elevated LFT's.     Elevated LFT's - mild elevation in LFT's likely related to Fatty liver. Reviewed mgmt of fatty liver disease as well as risks and concerns. Previous work up  included liver serologies which were overall unremarkable. Previous NAFLD fibrosis score rules out significant fibrosis. We therefore recommended her to focus on healthy lifestyle changes. Will repeat LFT's at this time since it has been 6 months and will recalculate fibrosis score. If LFT's remain stable can consider monitoring. Can consider fibrosis scan in the future if LFT's remain elevated.   Follow up in 6 months     Elevated LFTs  Fatty liver    Orders Placed This Encounter   Procedures     CBC with platelets differential     Comprehensive metabolic panel         Amarilis Arthur PA-C  Gastroenterology  [facility]

## 2020-07-15 DIAGNOSIS — R79.89 ELEVATED LFTS: ICD-10-CM

## 2020-07-15 DIAGNOSIS — K76.0 FATTY LIVER: ICD-10-CM

## 2020-07-15 LAB
ALBUMIN SERPL-MCNC: 3.7 G/DL (ref 3.4–5)
ALP SERPL-CCNC: 85 U/L (ref 40–150)
ALT SERPL W P-5'-P-CCNC: 82 U/L (ref 0–50)
ANION GAP SERPL CALCULATED.3IONS-SCNC: 5 MMOL/L (ref 3–14)
AST SERPL W P-5'-P-CCNC: 47 U/L (ref 0–45)
BASOPHILS # BLD AUTO: 0.1 10E9/L (ref 0–0.2)
BASOPHILS NFR BLD AUTO: 0.9 %
BILIRUB DIRECT SERPL-MCNC: <0.1 MG/DL (ref 0–0.2)
BILIRUB SERPL-MCNC: 0.4 MG/DL (ref 0.2–1.3)
BUN SERPL-MCNC: 12 MG/DL (ref 7–30)
CALCIUM SERPL-MCNC: 9 MG/DL (ref 8.5–10.1)
CHLORIDE SERPL-SCNC: 107 MMOL/L (ref 94–109)
CO2 SERPL-SCNC: 27 MMOL/L (ref 20–32)
CREAT SERPL-MCNC: 0.81 MG/DL (ref 0.52–1.04)
DIFFERENTIAL METHOD BLD: NORMAL
EOSINOPHIL # BLD AUTO: 0.4 10E9/L (ref 0–0.7)
EOSINOPHIL NFR BLD AUTO: 6.4 %
ERYTHROCYTE [DISTWIDTH] IN BLOOD BY AUTOMATED COUNT: 13.1 % (ref 10–15)
GFR SERPL CREATININE-BSD FRML MDRD: >90 ML/MIN/{1.73_M2}
GLUCOSE SERPL-MCNC: 105 MG/DL (ref 70–99)
HCT VFR BLD AUTO: 43.2 % (ref 35–47)
HGB BLD-MCNC: 13.9 G/DL (ref 11.7–15.7)
LYMPHOCYTES # BLD AUTO: 2.1 10E9/L (ref 0.8–5.3)
LYMPHOCYTES NFR BLD AUTO: 36.4 %
MCH RBC QN AUTO: 28.9 PG (ref 26.5–33)
MCHC RBC AUTO-ENTMCNC: 32.2 G/DL (ref 31.5–36.5)
MCV RBC AUTO: 90 FL (ref 78–100)
MONOCYTES # BLD AUTO: 0.3 10E9/L (ref 0–1.3)
MONOCYTES NFR BLD AUTO: 5.2 %
NEUTROPHILS # BLD AUTO: 2.9 10E9/L (ref 1.6–8.3)
NEUTROPHILS NFR BLD AUTO: 51.1 %
PLATELET # BLD AUTO: 226 10E9/L (ref 150–450)
POTASSIUM SERPL-SCNC: 4.3 MMOL/L (ref 3.4–5.3)
PROT SERPL-MCNC: 7.9 G/DL (ref 6.8–8.8)
RBC # BLD AUTO: 4.81 10E12/L (ref 3.8–5.2)
SODIUM SERPL-SCNC: 139 MMOL/L (ref 133–144)
WBC # BLD AUTO: 5.8 10E9/L (ref 4–11)

## 2020-07-15 PROCEDURE — 80053 COMPREHEN METABOLIC PANEL: CPT | Performed by: PHYSICIAN ASSISTANT

## 2020-07-15 PROCEDURE — 85025 COMPLETE CBC W/AUTO DIFF WBC: CPT | Performed by: PHYSICIAN ASSISTANT

## 2020-07-15 PROCEDURE — 82248 BILIRUBIN DIRECT: CPT | Performed by: PHYSICIAN ASSISTANT

## 2020-07-15 PROCEDURE — 36415 COLL VENOUS BLD VENIPUNCTURE: CPT | Performed by: PHYSICIAN ASSISTANT

## 2020-07-31 ENCOUNTER — TELEPHONE (OUTPATIENT)
Dept: GASTROENTEROLOGY | Facility: CLINIC | Age: 35
End: 2020-07-31

## 2020-07-31 NOTE — TELEPHONE ENCOUNTER
7/31 Provided phone number [phone] to schedule in about 6 months (around 1/14/2021).    Geovanna Stoddard   Procedure    Ortho/Sports Med/Pod/Ent/Eye/Surgical Specialties  [facility] [place]   [phone]

## 2020-10-15 ENCOUNTER — TELEPHONE (OUTPATIENT)
Dept: GASTROENTEROLOGY | Facility: CLINIC | Age: 35
End: 2020-10-15

## 2020-10-15 NOTE — LETTER
December 16, 2020      Shanna Hernandez  [address] [place] [address]        Dear Shanna Hernandez,    In order to ensure that we are providing the best quality care, we would like to remind you that you are due for a return virtual appointment with Amarilis Arthur around 1/14/21.      We have been unable to reach you by phone (or [facility]). Please call your clinic or use [facility] to make an appointment with your provider.  Please let us know if you have any questions and we would be happy to help.     Thank you for trusting us with your care.    Sincerely,     [facility]  [phone]

## 2020-10-15 NOTE — TELEPHONE ENCOUNTER
10/15 2nd attempt. Provided phone number [phone] to schedule in about 6 months (around 1/14/2021).    Geovanna Stoddard   Procedure    Ortho/Sports Med/Pod/Ent/Eye  [facility] [place]   [phone]

## 2020-12-16 NOTE — TELEPHONE ENCOUNTER
3rd attempt. Patient has not called to schedule.  Appointment reminder letter sent to patient.      Isi Andrews  Surgical Specialties Procedure   [facility] [employer] [place]  12/16/2020 8:04 AM

## 2021-01-04 ENCOUNTER — HEALTH MAINTENANCE LETTER (OUTPATIENT)
Age: 36
End: 2021-01-04

## 2021-02-22 ENCOUNTER — TELEPHONE (OUTPATIENT)
Dept: GASTROENTEROLOGY | Facility: CLINIC | Age: 36
End: 2021-02-22

## 2021-02-22 DIAGNOSIS — R79.89 ELEVATED LFTS: Primary | ICD-10-CM

## 2021-02-22 NOTE — TELEPHONE ENCOUNTER
Writer received a message from the call center about this patient asking a question pertaining to labs.   Patient wanted to know if she should come in to have labs checked before her VV on 2/25/2021.  She wanted to get them done for ease for Cha to diagnose  at the virtual visit. Patient told call center that she didn't want to waste a visit or Hanaa's time.  Will forward for advisement.  Pete Garay, CMA

## 2021-02-22 NOTE — TELEPHONE ENCOUNTER
"[facility] Call Center    Phone Message    May a detailed message be left on voicemail: yes     Reason for Call: Patient called wanting to know if she should get labs done prior to her upcoming appointment. Patient thinks it would be a \"waste of an appointment\" if she doesn't get it done before. Please advise. Thank you.    Action Taken: Message routed to:  Adult Clinics: Gastroenterology (GI) p [phone]    Travel Screening: Not Applicable                                                                      "

## 2021-02-23 DIAGNOSIS — R79.89 ELEVATED LFTS: ICD-10-CM

## 2021-02-23 LAB
ALBUMIN SERPL-MCNC: 3.9 G/DL (ref 3.4–5)
ALP SERPL-CCNC: 91 U/L (ref 40–150)
ALT SERPL W P-5'-P-CCNC: 86 U/L (ref 0–50)
AST SERPL W P-5'-P-CCNC: 47 U/L (ref 0–45)
BILIRUB DIRECT SERPL-MCNC: <0.1 MG/DL (ref 0–0.2)
BILIRUB SERPL-MCNC: 0.2 MG/DL (ref 0.2–1.3)
PROT SERPL-MCNC: 8.3 G/DL (ref 6.8–8.8)

## 2021-02-23 PROCEDURE — 36415 COLL VENOUS BLD VENIPUNCTURE: CPT | Performed by: PHYSICIAN ASSISTANT

## 2021-02-23 PROCEDURE — 80076 HEPATIC FUNCTION PANEL: CPT | Performed by: PHYSICIAN ASSISTANT

## 2021-02-25 ENCOUNTER — VIRTUAL VISIT (OUTPATIENT)
Dept: GASTROENTEROLOGY | Facility: CLINIC | Age: 36
End: 2021-02-25
Payer: COMMERCIAL

## 2021-02-25 DIAGNOSIS — R79.89 ELEVATED LFTS: ICD-10-CM

## 2021-02-25 DIAGNOSIS — K76.0 FATTY LIVER: ICD-10-CM

## 2021-02-25 DIAGNOSIS — K58.0 IRRITABLE BOWEL SYNDROME WITH DIARRHEA: Primary | ICD-10-CM

## 2021-02-25 PROCEDURE — 99205 OFFICE O/P NEW HI 60 MIN: CPT | Mod: TEL | Performed by: PHYSICIAN ASSISTANT

## 2021-02-25 NOTE — PATIENT INSTRUCTIONS
Keep a food diary and track your symptoms. Start working on an elimination diet. See low FODMAP diet information below or at the end of this after visit summary.     For example cut out dairy for the next 2 weeks and see if your symptoms improve. You can also try Lactaid enzyme over the counter when eating dairy products to help offset some symptoms.     IB Azra is another option to take for digestive symptoms. This can also be found over the counter and is for irritable bowel syndrome. You can take this daily or as needed.     In regards to your liver enzymes, they are remaining stable. As mentioned the elevated liver enzymes are likely related to fatty liver. Please continue to work on healthy lifestyle changes. We will plan to recheck your liver enzymes in 6 months prior to our next follow up.       Patient Education     Diet and Lifestyle Tips for Irritable Bowel Syndrome (IBS)    Your healthcare professional may suggest some lifestyle changes to help control your IBS. Changing your diet and managing stress are 2 of the most important. Follow your healthcare provider s instructions and try some of the suggestions below.   Change your diet  Your diet may be an important cause of IBS symptoms. You may want to try the following:    Pay attention to what foods bother you, and stay away from them. For example, dairy products are hard for some people to digest. Lactose-free dairy products may be better for your symptoms.    Don't eat high FODMAP foods. Other common foods that can cause symptoms contain carbohydrates called FODMAPs. These are poorly digested by your body. High FODMAP foods include some fruits such as apples, vegetables such as cabbage, and some dairy. They also include certain sweeteners such as high-fructose corn syrup, sorbitol, and xylitol. Many people find that eating a diet low in FODMAPs can ease symptoms. Talk with your healthcare provider about a low FODMAP diet.    Drink 6 to 8 glasses of  water a day.    Don't have caffeine or tobacco. These are muscle stimulants and can affect the working of your digestive tract.    Don't drink alcohol. It can irritate your digestive tract and make your symptoms worse.    Eat more fiber if constipation is a problem. Fiber makes the stool softer and easier to pass through the colon.    Eat more fiber if diarrhea is a problem. Fiber also helps to bind water. This can help to firm up loose stool.  Reduce stress  If stress or anxiety makes your IBS symptoms worse, learning how to manage stress may help you feel better. Try these tips:     Identify the causes of stress in your life.    Learn new ways to cope with them.    Regular exercise is a great way to relieve stress. It can also help ease constipation. For adults, the CDC recommends 150 minutes of moderate activity each week. It also recommends muscle-strengthening activities 2 days a week. If this sounds like a lot of time, the CDC suggests breaking physical activity into 10-minute blocks and spreading it out over a week. Developing a schedule that works for you is the key to a successful exercise program.  [employer] last reviewed this educational content on 8/1/2018 2000-2020 [employer]. [address] [place] [address], [place] [address]. All rights reserved. This information is not intended as a substitute for professional medical care. Always follow your healthcare professional's instructions.

## 2021-02-25 NOTE — PROGRESS NOTES
Shanna is a 35 year old who is being evaluated via a billable telephone visit.      What phone number would you like to be contacted at? [phone]  How would you like to obtain your AVS? [facility]   Roomed by: Sherrie Bearden LPN on 2/25/21 at 10:58 AM      GASTROENTEROLOGY FOLLOW UP VIRTUAL VISIT     Subjective     Shanna is a 35 year old female who presents for follow up via telephone visit. She was initially seen in December 2019 for concerns with elevated LFT's. She was noted to have mildly elevated LFTs at that time. We further evaluated with liver serologies all of which were unremarkable including viral hepatitis panel, autoimmune markers and celiac serology. Imaging revealed fatty liver. It was thought that her elevated LFT's were likely related to fatty liver disease. There is no abdominal distention, n/v. No jaundice. No itching. She has been working on weight loss through healthy diet and exercise.     She was also having diarrhea at our initial office visit in December 2019. Work up included stool studies which were negative for infection. She did have a mildly elevated fecal calpro initially, however on recheck this was found to be normal. Her diarrhea has much improved since then and she notes now that it is mainly during times of stress. She has more recently started on zoloft for depression/anxiety and notes this has helped her handle stress better. She is taking metamucil to help regulate her BM's. She no longer has diarrhea, has more of a mushier stool. It does depend on what she eats. Certain foods will cause some stomach discomfort followed by diarrhea.        PMHx, PSHx, FHx, SHx have been reviewed.           Objective           Vitals:  No vitals were obtained today due to virtual visit.    Physical Exam   healthy, alert and no distress  PSYCH: Alert and oriented times 3; coherent speech, normal   rate and volume, able to articulate logical thoughts, able   to abstract reason, no tangential thoughts,  no hallucinations   or delusions  Her affect is normal  RESP: No cough, no audible wheezing, able to talk in full sentences  Remainder of exam unable to be completed due to telephone visits       Ref Range & Units 2d ago   (2/23/21) 7mo ago   (7/15/20) 7mo ago   (7/15/20)   Bilirubin Direct 0.0 - 0.2 mg/dL <0.1  <0.1     Bilirubin Total 0.2 - 1.3 mg/dL 0.2   0.4    Albumin 3.4 - 5.0 g/dL 3.9   3.7    Protein Total 6.8 - 8.8 g/dL 8.3   7.9    Alkaline Phosphatase 40 - 150 U/L 91   85    ALT 0 - 50 U/L 86High    82High     AST 0 - 45 U/L 47High    47High               Assessment & Plan     Irritable bowel syndrome with diarrhea  Elevated LFTs  Fatty liver    Shanna Hernandez is a pleasant 35 year old female who presents via telephone visit for follow up. I have been following Shanna since December 2019 when she initially presented for elevated liver enzymes. Her work up at that time included liver serologies: viral hepatitis panel, autoimmune markers and celiac serology all of which were negative. Her mildly elevated liver enzymes were thought to be related to her fatty liver which was noted on imaging. She was advised to work on healthy lifestyle changes through diet and exercise which she has been doing. She recently had LFT's repeated. Results reviewed today. LFT's continue to remain mildly elevated. Calculated NAFLD fibrosis score continues to rule out significant fibrosis. NAFLD score correlates to F0-F2 Encouraged patient to continue with healthy lifestyle changes and to continue to monitor LFT's. Will plan to follow up in 6 months to check progress.     We also reviewed her occasional abdominal discomfort which improves with diarrhea. Symptoms are related to stress/anxiety and diet. Her symptoms are most consistent with an IBS-D. Recommended keeping a food diary, trailing low FODMAP and elimination diet and avoiding known dietary triggers. Also recommended trying lactaid as per her report she may be lactose  intolerant. IB matt is also an option for IBS symptoms. Offered nutrition referral, however pt defers at this time.     60 minutes spent on the date of the encounter doing chart review, history and exam, documentation and further activities as noted above      Return in about 6 months (around 8/25/2021) for Follow up.    Amarilis Arthur PA-C  [facility]    Phone call duration: 25 minutes

## 2021-10-10 ENCOUNTER — HEALTH MAINTENANCE LETTER (OUTPATIENT)
Age: 36
End: 2021-10-10

## 2022-01-30 ENCOUNTER — HEALTH MAINTENANCE LETTER (OUTPATIENT)
Age: 37
End: 2022-01-30

## 2022-05-12 NOTE — TELEPHONE ENCOUNTER
Patient contacted, informed that lab orders have been placed and may be completed at any [facility] location.      Lita Victoria RN     Improved

## 2022-09-18 ENCOUNTER — HEALTH MAINTENANCE LETTER (OUTPATIENT)
Age: 37
End: 2022-09-18

## 2023-05-07 ENCOUNTER — HEALTH MAINTENANCE LETTER (OUTPATIENT)
Age: 38
End: 2023-05-07